# Patient Record
(demographics unavailable — no encounter records)

---

## 2024-11-26 NOTE — ASSESSMENT
[Diabetes Foot Care] : diabetes foot care [Long Term Vascular Complications] : long term vascular complications of diabetes [Carbohydrate Consistent Diet] : carbohydrate consistent diet [Importance of Diet and Exercise] : importance of diet and exercise to improve glycemic control, achieve weight loss and improve cardiovascular health [Exercise/Effect on Glucose] : exercise/effect on glucose [Hypoglycemia Management] : hypoglycemia management [Self Monitoring of Blood Glucose] : self monitoring of blood glucose [Retinopathy Screening] : Patient was referred to ophthalmology for retinopathy screening [FreeTextEntry1] : Elderly white male with a past medical history of a type 2 diabetes and primary hypothyroidism currently maintained on insulin Lantus 40 units every day at night, Jardiance 25 mg daily, Rybelsus 7 mg daily and glimepiride 4 mg twice a day.  Patient recently had a blood test which showed that his fasting glucose was 110 mg per DL complete metabolic panel was normal except for elevated AST and a total bilirubin of 1.7.  Hemoglobin A1c is 8 poin 9.5%.  Urinalysis is positive for glucose and protein, TSH level is normal 3.55.  Patient with poorly controlled type 2 diabetes which I suspect is most likely secondary to poor compliance either with diet and or medications.  Recommendation 1.  I have advised the patient to change the insulin Lantus to Tresiba 40 units at nighttime. 2.  We will also increase the dose of Rybelsus to 14 mg tablets daily. 3.  Patient will continue with the glimepiride 4 mg twice a day and Jardiance 25 mg daily and also the levothyroxine 125 mcg tablets daily. 4.  The importance of a low-carb and low-fat diet was discussed with the patient together with physical activity. 5.  Footcare was stressed upon the patient and that he should definitely make an appointment with a podiatrist for future footcare. 6.  If he remains clinically stable he will follow-up in 3 months time with a repeat blood test.  Plan discussed with the patient thank you

## 2024-11-26 NOTE — REVIEW OF SYSTEMS
[Fatigue] : no fatigue [Decreased Appetite] : decreased appetite [Pain/Numbness of Digits] : no pain/numbness of digits [Negative] : Heme/Lymph [de-identified] : Severely dry skin with dystrophic changes of the toenails

## 2024-11-26 NOTE — HISTORY OF PRESENT ILLNESS
[FreeTextEntry1] : 81-year-old male with a medical history of type 2 diabetes present for a routine follow up visit. Patient is currently taking medications of aspirin 81mg, Atorvastatin 40mg, Eliquis 5mg, Glimepiride 4mg, Jardiance 25mg, Lantus solo star, Levothyroxine 125mcg, Losartan 100mg, Metoprolol ER 25mg, and Rybelsus 7mg. Patient has a past medical history of hyperlipidemia, hypertension, hyperlipidemia ang hypothyroidism. Patient states that he is compliant with following his diet, his weight is stable. Patient denies any significant symptoms of chest pain, sob, abdominal pain, nausea or vomiting. His vision has been stable, circulation of the lower extremities remains stable although patients skin appears to be severely dry, and toenail fungus, advise patient to follow up with a podiatrist. He denies any numbness or tingling of the extremities, Review of systems otherwise is negative patient did not bring his glucometer but he claimed that his fingersticks in the morning are between 120 to 140 mg per DL and after the meals they do not exceed 140 mg per DL.  Patient admits that he is not always compliant with his diet and has mild polyuria and polydipsia.  He denies any abdominal pain nausea vomiting or any heartburn.  He has not noticed any chest pain or numbness of extremities physically he is not very active.  Patient recently had an eye examination which according to him was reported to be normal.

## 2024-11-26 NOTE — PHYSICAL EXAM
[Alert] : alert [Well Nourished] : well nourished [No Acute Distress] : no acute distress [Well Developed] : well developed [Normal Sclera/Conjunctiva] : normal sclera/conjunctiva [EOMI] : extra ocular movement intact [No Proptosis] : no proptosis [Normal Oropharynx] : the oropharynx was normal [Thyroid Not Enlarged] : the thyroid was not enlarged [No Thyroid Nodules] : no palpable thyroid nodules [No Respiratory Distress] : no respiratory distress [No Accessory Muscle Use] : no accessory muscle use [Clear to Auscultation] : lungs were clear to auscultation bilaterally [Normal S1, S2] : normal S1 and S2 [Normal Rate] : heart rate was normal [Regular Rhythm] : with a regular rhythm [No Edema] : no peripheral edema [Normal Bowel Sounds] : normal bowel sounds [Not Tender] : non-tender [Not Distended] : not distended [Soft] : abdomen soft [Normal Anterior Cervical Nodes] : no anterior cervical lymphadenopathy [Normal Posterior Cervical Nodes] : no posterior cervical lymphadenopathy [No Spinal Tenderness] : no spinal tenderness [Spine Straight] : spine straight [No Stigmata of Cushings Syndrome] : no stigmata of Cushings Syndrome [Normal Gait] : normal gait [Normal Strength/Tone] : muscle strength and tone were normal [No Rash] : no rash [Acanthosis Nigricans] : no acanthosis nigricans [Foot Ulcers] : no foot ulcers [No Motor Deficits] : the motor exam was normal [Normal Reflexes] : deep tendon reflexes were 2+ and symmetric [No Tremors] : no tremors [Oriented x3] : oriented to person, place, and time [de-identified] : Patient BMI is 27.99 [de-identified] : Distal pulses are diminished bilaterally over the ankles [de-identified] : Slightly obese [de-identified] : Dry skin with scaly changes and dystrophic toenails